# Patient Record
Sex: MALE | Race: WHITE | Employment: UNEMPLOYED | URBAN - METROPOLITAN AREA
[De-identification: names, ages, dates, MRNs, and addresses within clinical notes are randomized per-mention and may not be internally consistent; named-entity substitution may affect disease eponyms.]

---

## 2018-10-26 ENCOUNTER — PROCEDURE VISIT (OUTPATIENT)
Dept: AUDIOLOGY | Age: 68
End: 2018-10-26
Payer: MEDICARE

## 2018-10-26 ENCOUNTER — OFFICE VISIT (OUTPATIENT)
Dept: ENT CLINIC | Age: 68
End: 2018-10-26
Payer: MEDICARE

## 2018-10-26 VITALS
WEIGHT: 200.5 LBS | HEIGHT: 69 IN | BODY MASS INDEX: 29.7 KG/M2 | DIASTOLIC BLOOD PRESSURE: 71 MMHG | HEART RATE: 56 BPM | SYSTOLIC BLOOD PRESSURE: 122 MMHG

## 2018-10-26 DIAGNOSIS — H90.3 SENSORINEURAL HEARING LOSS (SNHL) OF BOTH EARS: Primary | ICD-10-CM

## 2018-10-26 DIAGNOSIS — H90.3 SENSORINEURAL HEARING LOSS, BILATERAL: ICD-10-CM

## 2018-10-26 PROCEDURE — 92557 COMPREHENSIVE HEARING TEST: CPT | Performed by: AUDIOLOGIST

## 2018-10-26 PROCEDURE — 99213 OFFICE O/P EST LOW 20 MIN: CPT | Performed by: OTOLARYNGOLOGY

## 2018-10-26 PROCEDURE — 92567 TYMPANOMETRY: CPT | Performed by: AUDIOLOGIST

## 2018-10-26 RX ORDER — DONEPEZIL HYDROCHLORIDE 5 MG/1
5 TABLET, ORALLY DISINTEGRATING ORAL NIGHTLY
COMMUNITY

## 2018-10-26 ASSESSMENT — ENCOUNTER SYMPTOMS: SHORTNESS OF BREATH: 0

## 2018-10-26 NOTE — PROGRESS NOTES
[address] [facility] Otolaryngology  Dr. Adam Edmond. Wheeler Scheuermann. Ms.Ed        Patient Name:  Damian Meyer  :  1950     CHIEF C/O:    Chief Complaint   Patient presents with    Hearing Problem     1 yr hearing check; havent noticed any changes but tends to get plugged up       HISTORY OBTAINED FROM:  patient    HISTORY OF PRESENT ILLNESS:       Terrance Randle is a 76y.o. year old male, here today for follow up of hearing evaluation. No changes or infections since his last visit, continues to use hearing aids without issue, occasionally feels plugged up. Denies otalgia, otorrhea, tinnitus, allergies, nasal congestion. Past Medical History:   Diagnosis Date    Hepatitis     from food 30 years ago    Hypertension      Past Surgical History:   Procedure Laterality Date    HAND TENDON SURGERY  35 years ago    left hand middle finger       Current Outpatient Prescriptions:     donepezil (ARICEPT ODT) 5 MG disintegrating tablet, Take 5 mg by mouth nightly, Disp: , Rfl:     metFORMIN (GLUCOPHAGE) 500 MG tablet, Take 500 mg by mouth daily. , Disp: , Rfl:     simvastatin (ZOCOR) 40 MG tablet, Take 40 mg by mouth nightly., Disp: , Rfl:     triamterene-hydrochlorothiazide (MAXZIDE-25) 37.5-25 MG per tablet, Take 1 tablet by mouth daily. , Disp: , Rfl:     aspirin 81 MG EC tablet, Take 81 mg by mouth daily. , Disp: , Rfl:     Psyllium (METAMUCIL PO), Take  by mouth., Disp: , Rfl:   Patient has no known allergies. Social History   Substance Use Topics    Smoking status: Never Smoker    Smokeless tobacco: Never Used    Alcohol use Yes      Comment: a couple drinks pe [facility]     Family History   Problem Relation Age of Onset    Dementia Mother     Cancer Father         unknown    Cancer Maternal Grandfather         unknown       Review of Systems   Constitutional: Negative for chills, fatigue and fever. HENT: Negative for congestion, ear discharge, ear pain, hearing loss and tinnitus.     Respiratory: Negative for cough and DO  11/10/18

## 2018-11-10 ASSESSMENT — ENCOUNTER SYMPTOMS
VOMITING: 0
COUGH: 0

## 2019-11-01 ENCOUNTER — OFFICE VISIT (OUTPATIENT)
Dept: ENT CLINIC | Age: 69
End: 2019-11-01
Payer: MEDICARE

## 2019-11-01 ENCOUNTER — PROCEDURE VISIT (OUTPATIENT)
Dept: AUDIOLOGY | Age: 69
End: 2019-11-01
Payer: MEDICARE

## 2019-11-01 VITALS
SYSTOLIC BLOOD PRESSURE: 133 MMHG | HEART RATE: 54 BPM | DIASTOLIC BLOOD PRESSURE: 79 MMHG | BODY MASS INDEX: 27.41 KG/M2 | WEIGHT: 191.5 LBS | HEIGHT: 70 IN

## 2019-11-01 DIAGNOSIS — H91.90 UNILATERAL HEARING LOSS: ICD-10-CM

## 2019-11-01 PROCEDURE — 92557 COMPREHENSIVE HEARING TEST: CPT | Performed by: AUDIOLOGIST

## 2019-11-01 PROCEDURE — 99213 OFFICE O/P EST LOW 20 MIN: CPT | Performed by: OTOLARYNGOLOGY

## 2019-11-01 PROCEDURE — 92567 TYMPANOMETRY: CPT | Performed by: AUDIOLOGIST

## 2019-11-11 ASSESSMENT — ENCOUNTER SYMPTOMS
VOMITING: 0
COUGH: 0
SHORTNESS OF BREATH: 0
SINUS PRESSURE: 0
VOICE CHANGE: 0
SORE THROAT: 0

## 2020-11-03 ENCOUNTER — PROCEDURE VISIT (OUTPATIENT)
Dept: AUDIOLOGY | Age: 70
End: 2020-11-03
Payer: MEDICARE

## 2020-11-03 ENCOUNTER — OFFICE VISIT (OUTPATIENT)
Dept: ENT CLINIC | Age: 70
End: 2020-11-03
Payer: MEDICARE

## 2020-11-03 VITALS
WEIGHT: 181.2 LBS | BODY MASS INDEX: 26.37 KG/M2 | DIASTOLIC BLOOD PRESSURE: 87 MMHG | HEART RATE: 55 BPM | SYSTOLIC BLOOD PRESSURE: 135 MMHG

## 2020-11-03 PROCEDURE — 99213 OFFICE O/P EST LOW 20 MIN: CPT | Performed by: OTOLARYNGOLOGY

## 2020-11-03 PROCEDURE — 92567 TYMPANOMETRY: CPT | Performed by: AUDIOLOGIST

## 2020-11-03 PROCEDURE — 92557 COMPREHENSIVE HEARING TEST: CPT | Performed by: AUDIOLOGIST

## 2020-11-03 RX ORDER — AZELASTINE 1 MG/ML
2 SPRAY, METERED NASAL 2 TIMES DAILY
Qty: 1 BOTTLE | Refills: 1 | Status: SHIPPED
Start: 2020-11-03 | End: 2021-11-09

## 2020-11-03 NOTE — PROGRESS NOTES
[id number] [facility] Otolaryngology  Dr. Hart Listen. Rosas Kumar. Ms.Ed        Patient Name:  Franko Mae  :  1950     CHIEF C/O:    Chief Complaint   Patient presents with    Hearing Loss     no noticable change to hearing per pt       HISTORY OBTAINED FROM:  patient    HISTORY OF PRESENT ILLNESS:       Iker Espino is a 79y.o. year old male, here today for follow up of known history of bilateral sensorineural hearing loss. Patient states he is here for routine follow-up, denies any new significant changes in hearing loss, no new changes in tinnitus or recent history of vertigo. No current complaints of fever chills or sore throat, he does complain of chronic congestion with noted clear rhinorrhea. He states the rhinorrhea is becoming persistently more significant, without any associated epistaxis. Patient has tried over-the-counter Flonase, as well as oral antidecongestants without any significant improvement. Past Medical History:   Diagnosis Date    Hepatitis     from food 30 years ago    Hypertension      Past Surgical History:   Procedure Laterality Date    HAND TENDON SURGERY  35 years ago    left hand middle finger       Current Outpatient Medications:     azelastine (ASTELIN) 0.1 % nasal spray, 2 sprays by Nasal route 2 times daily Use in each nostril as directed, Disp: 1 Bottle, Rfl: 1    donepezil (ARICEPT ODT) 5 MG disintegrating tablet, Take 5 mg by mouth nightly, Disp: , Rfl:     metFORMIN (GLUCOPHAGE) 500 MG tablet, Take 500 mg by mouth daily. , Disp: , Rfl:     simvastatin (ZOCOR) 40 MG tablet, Take 40 mg by mouth nightly., Disp: , Rfl:     triamterene-hydrochlorothiazide (MAXZIDE-25) 37.5-25 MG per tablet, Take 1 tablet by mouth daily. , Disp: , Rfl:     aspirin 81 MG EC tablet, Take 81 mg by mouth daily. , Disp: , Rfl:     Psyllium (METAMUCIL PO), Take  by mouth., Disp: , Rfl:   Patient has no known allergies.   Social History     Tobacco Use    Smoking status: Never Smoker    Smokeless motion. Lymphadenopathy:      Cervical: No cervical adenopathy. Skin:     General: Skin is warm. Findings: No erythema. Neurological:      Mental Status: He is alert. Cranial Nerves: No cranial nerve deficit. IMPRESSION/PLAN:  Patient seen and examined, full audiogram is consistent with previous audiogram showing mild to moderate bilateral sensorineural hearing loss    Dr. Robert Pedroza.  Otolaryngology Facial Plastic Surgery  :  [address] [place] [address] Otolaryngology/Facial Plastic Surgery Residency  Associate Clinical Professor:  Willis Bowman, [facility]

## 2020-11-03 NOTE — PROGRESS NOTES
This patient was referred for audiometric/tympanometric testing by Dr. Augustine Hinds due to a longstanding hearing loss and tinnitus, right ear worse. Patient is being seen for his yearly ENT/    Audiometry revealed a mild-to-moderately-severe sensorineural hearing loss, through the frequency range, bilaterally (right ear/slightly worse). Reliability was good. Speech reception thresholds were in good agreement with the pure tone averages, bilaterally. Speech discrimination scores were 92%, at 80dBHL, right ear and 65dBHL, left ear. Tympanometry revealed normal middle ear peak pressure and compliance, bilaterally. Ipsilateral acoustic reflexes were absent, right ear and present, left ear at 1000Hz. The results were reviewed with the patient. Recommendations for follow up will be made pending physician consult.     Christiano Cowan [facility]

## 2020-11-20 ASSESSMENT — ENCOUNTER SYMPTOMS
SINUS PAIN: 0
COUGH: 0
SORE THROAT: 0
VOMITING: 0
SHORTNESS OF BREATH: 0
RHINORRHEA: 1
VOICE CHANGE: 0
SINUS PRESSURE: 0
TROUBLE SWALLOWING: 0

## 2021-11-09 ENCOUNTER — PROCEDURE VISIT (OUTPATIENT)
Dept: AUDIOLOGY | Age: 71
End: 2021-11-09
Payer: MEDICARE

## 2021-11-09 ENCOUNTER — OFFICE VISIT (OUTPATIENT)
Dept: ENT CLINIC | Age: 71
End: 2021-11-09
Payer: MEDICARE

## 2021-11-09 VITALS — HEIGHT: 70 IN | BODY MASS INDEX: 26.05 KG/M2 | WEIGHT: 182 LBS

## 2021-11-09 DIAGNOSIS — H90.3 SENSORINEURAL HEARING LOSS (SNHL) OF BOTH EARS: Primary | ICD-10-CM

## 2021-11-09 DIAGNOSIS — H90.3 SENSORY HEARING LOSS, BILATERAL: Primary | ICD-10-CM

## 2021-11-09 DIAGNOSIS — H61.23 CERUMEN DEBRIS ON TYMPANIC MEMBRANE OF BOTH EARS: ICD-10-CM

## 2021-11-09 DIAGNOSIS — H93.13 TINNITUS, BILATERAL: ICD-10-CM

## 2021-11-09 PROCEDURE — 92567 TYMPANOMETRY: CPT | Performed by: AUDIOLOGIST

## 2021-11-09 PROCEDURE — 99214 OFFICE O/P EST MOD 30 MIN: CPT | Performed by: OTOLARYNGOLOGY

## 2021-11-09 PROCEDURE — 92557 COMPREHENSIVE HEARING TEST: CPT | Performed by: AUDIOLOGIST

## 2021-11-09 PROCEDURE — 69210 REMOVE IMPACTED EAR WAX UNI: CPT | Performed by: OTOLARYNGOLOGY

## 2021-11-09 RX ORDER — SIMVASTATIN 40 MG
20 TABLET ORAL
COMMUNITY
End: 2021-11-09 | Stop reason: ALTCHOICE

## 2021-11-09 RX ORDER — SIMVASTATIN 20 MG
20 TABLET ORAL NIGHTLY
COMMUNITY

## 2021-11-09 RX ORDER — OXYBUTYNIN CHLORIDE 5 MG/1
5 TABLET, EXTENDED RELEASE ORAL DAILY
COMMUNITY

## 2021-11-09 RX ORDER — MULTIVITAMIN WITH IRON
100 TABLET ORAL 2 TIMES DAILY
COMMUNITY

## 2021-11-09 NOTE — PROGRESS NOTES
This patient was referred for audiometric/tympanometric testing by Dr. Nalini Alvares due to longstanding bilateral hearing loss. He reported increased difficulty hearing, especially in background noise. He denied any tinnitus, dizziness, or ear pain. Audiometry revealed an essentially mild sloping to severe sensorineural hearing loss, in the right ear and essentially mild sloping to moderately severe sensorineural hearing loss, in the left ear. Reliability was good. Speech reception thresholds were in good agreement with the pure tone averages, bilaterally. Speech discrimination scores were good, in the right ear and excellent, in the left ear. Tympanometry revealed normal middle ear peak pressure and compliance, bilaterally. The results were reviewed with the patient. Recommendations for follow up will be made pending physician consult. Briefly discussed new hearing aid options. Gave patient information for [employer] hearing aid providers. He will follow up if needed.     Soha Peralta [facility]  [address] [facility] [phone]  Electronically signed by Soha Peralta on 11/9/2021 at 3:54 PM

## 2021-11-09 NOTE — PROGRESS NOTES
Yearly hearingf relatively stabelle    wqill nneed new HA as there are 11years old    Cleaned ears as well   See I 1 year     [facility] Otolaryngology  Dr. Grant Barrios. Graham Tracy. Ms.Ed        Patient Name:  Evan Dumont  :  1950     CHIEF C/O:    Chief Complaint   Patient presents with    Hearing Problem     yearly hearing eval-- thinks hearing is worse but cannot tell for sure since its gradual    Cerumen Impaction     right sided impaction       HISTORY OBTAINED FROM:  patient    HISTORY OF PRESENT ILLNESS:       Elier Perry is a 70y.o. year old male, here today for follow up of history of bilateral sensorineural hearing loss, without any new significant tinnitus vertigo or new changes in hearing. He is noting that he has difficulty with using his hearing aids, and that intermittently functioning inappropriately. Denies any other complaints of ear pain or drainage, no complaints of nasal congestion fever chills sore throat, no complaints of hoarseness at this time. Full audiogram was conducted today and reviewed with the patient. Past Medical History:   Diagnosis Date    Hepatitis     from food 30 years ago    Hypertension      Past Surgical History:   Procedure Laterality Date    HAND TENDON SURGERY  35 years ago    left hand middle finger       Current Outpatient Medications:     oxybutynin (DITROPAN-XL) 5 MG extended release tablet, Take 5 mg by mouth daily, Disp: , Rfl:     vitamin B-6 (PYRIDOXINE) 100 MG tablet, Take 100 mg by mouth 2 times daily, Disp: , Rfl:     simvastatin (ZOCOR) 20 MG tablet, Take 20 mg by mouth nightly, Disp: , Rfl:     donepezil (ARICEPT ODT) 5 MG disintegrating tablet, Take 5 mg by mouth nightly, Disp: , Rfl:     metFORMIN (GLUCOPHAGE) 500 MG tablet, Take 500 mg by mouth daily. , Disp: , Rfl:     triamterene-hydrochlorothiazide (MAXZIDE-25) 37.5-25 MG per tablet, Take 1 tablet by mouth daily. , Disp: , Rfl:     aspirin 81 MG EC tablet, Take 81 mg by mouth daily. , Disp: , Rfl:   Patient has no known allergies. Social History     Tobacco Use    Smoking status: Never Smoker    Smokeless tobacco: Never Used   Substance Use Topics    Alcohol use: Yes     Comment: a couple drinks pe rweek    Drug use: No     Family History   Problem Relation Age of Onset    Dementia Mother     Cancer Father         unknown    Cancer Maternal Grandfather         unknown       Review of Systems   Constitutional: Negative for chills and fever. HENT: Negative for congestion, ear discharge, hearing loss, rhinorrhea, sinus pressure and sinus pain. Respiratory: Negative for cough and shortness of breath. Cardiovascular: Negative for chest pain and palpitations. Gastrointestinal: Negative for vomiting. Skin: Negative for rash. Allergic/Immunologic: Negative for environmental allergies. Neurological: Negative for dizziness and headaches. Hematological: Does not bruise/bleed easily. All other systems reviewed and are negative. Ht 5' 9.5\" (1.765 m)   Wt 182 lb (82.6 kg)   BMI 26.49 kg/m²   Physical Exam                 Procedure: Cerumen Impaction    Pre-op: Cerumen Impaction    Post Op: Same    Procedure: Cerumen Impaction removal    Surgeon: Jason Unger    Procedure: Under microscopic assistance large cerumen impaction was removed using gentle suction and curette. TM intact B/L, Pt tolerated procedure well    Complications: None    Blood Loss Minimial      IMPRESSION/PLAN:  Patient seen and examined for known history of bilateral sensorineural hearing loss, which audiogram today shows relatively stable changes patient is requesting a new hearing aids, as he is a greater than 11years old and no longer functioning appropriately for his level of hearing loss. He will be referred for audiology for refitting of hearing aids.     Patient also with a history of ear fullness and pressure, with bilateral cerumen packs was removed under microscopic assistance today without complication. Patient will follow-up for yearly hearing screen ear cerumen packs removal.    Dr. Elodia Schirmer D. Bunevich D.O. Ms. Mark Chasten.  Otolaryngology Facial Plastic Surgery  :  [facility] Otolaryngology/Facial Plastic Surgery Residency  Associate Clinical Professor:  Ya Galdamez, [employer]  [employer]

## 2021-11-29 ASSESSMENT — ENCOUNTER SYMPTOMS
SINUS PAIN: 0
SINUS PRESSURE: 0
SHORTNESS OF BREATH: 0
RHINORRHEA: 0
VOMITING: 0
COUGH: 0

## 2022-11-16 ENCOUNTER — PROCEDURE VISIT (OUTPATIENT)
Dept: AUDIOLOGY | Age: 72
End: 2022-11-16
Payer: MEDICARE

## 2022-11-16 ENCOUNTER — OFFICE VISIT (OUTPATIENT)
Dept: ENT CLINIC | Age: 72
End: 2022-11-16
Payer: MEDICARE

## 2022-11-16 VITALS
WEIGHT: 187.7 LBS | HEIGHT: 69 IN | BODY MASS INDEX: 27.8 KG/M2 | DIASTOLIC BLOOD PRESSURE: 73 MMHG | HEART RATE: 66 BPM | SYSTOLIC BLOOD PRESSURE: 117 MMHG

## 2022-11-16 DIAGNOSIS — H90.3 SENSORINEURAL HEARING LOSS, BILATERAL: Primary | ICD-10-CM

## 2022-11-16 DIAGNOSIS — H90.3 SENSORY HEARING LOSS, BILATERAL: Primary | ICD-10-CM

## 2022-11-16 PROCEDURE — 92557 COMPREHENSIVE HEARING TEST: CPT | Performed by: AUDIOLOGIST

## 2022-11-16 PROCEDURE — 99213 OFFICE O/P EST LOW 20 MIN: CPT | Performed by: OTOLARYNGOLOGY

## 2022-11-16 PROCEDURE — 1123F ACP DISCUSS/DSCN MKR DOCD: CPT | Performed by: OTOLARYNGOLOGY

## 2022-11-16 PROCEDURE — 69210 REMOVE IMPACTED EAR WAX UNI: CPT | Performed by: OTOLARYNGOLOGY

## 2022-11-16 PROCEDURE — 92567 TYMPANOMETRY: CPT | Performed by: AUDIOLOGIST

## 2022-11-16 ASSESSMENT — ENCOUNTER SYMPTOMS
COUGH: 0
SHORTNESS OF BREATH: 0
SINUS PRESSURE: 0
SINUS PAIN: 0
VOMITING: 0
RHINORRHEA: 0

## 2022-11-16 ASSESSMENT — VISUAL ACUITY: OU: 1

## 2022-11-16 NOTE — PROGRESS NOTES
[facility] Otolaryngology  Dr. Edd Solares. Eleno Roberson. Ms.Ed        Patient Name:  Anuja Vaughan  :  1950     CHIEF C/O:    Chief Complaint   Patient presents with    Hearing Loss     In crowds seems to be worse       HISTORY OBTAINED FROM:  patient    HISTORY OF PRESENT ILLNESS:       Yadi Mcpherson is a 67y.o. year old male, here today for follow up of history of bilateral sensorineural hearing loss, without any new significant tinnitus vertigo or new changes in hearing. He is noting that he has difficulty with using his hearing aids, and that intermittently functioning inappropriately. Denies any other complaints of ear pain or drainage, no complaints of nasal congestion fever chills sore throat, no complaints of hoarseness at this time. Full audiogram was conducted today and reviewed with the patient. 22: Pt doing well. Wearing hearing aids daily. Thinks they are not working as well. Denies any other issues such as ear pain or drainage or dizziness. .    Past Medical History:   Diagnosis Date    Hepatitis     from food 30 years ago    Hypertension      Past Surgical History:   Procedure Laterality Date    HAND TENDON SURGERY  35 years ago    left hand middle finger       Current Outpatient Medications:     oxybutynin (DITROPAN-XL) 5 MG extended release tablet, Take 5 mg by mouth daily, Disp: , Rfl:     vitamin B-6 (PYRIDOXINE) 100 MG tablet, Take 100 mg by mouth 2 times daily, Disp: , Rfl:     simvastatin (ZOCOR) 20 MG tablet, Take 20 mg by mouth nightly, Disp: , Rfl:     donepezil (ARICEPT ODT) 5 MG disintegrating tablet, Take 5 mg by mouth nightly, Disp: , Rfl:     metFORMIN (GLUCOPHAGE) 500 MG tablet, Take 500 mg by mouth daily. , Disp: , Rfl:     triamterene-hydrochlorothiazide (MAXZIDE-25) 37.5-25 MG per tablet, Take 1 tablet by mouth daily. , Disp: , Rfl:     aspirin 81 MG EC tablet, Take 81 mg by mouth daily. , Disp: , Rfl:   Patient has no known allergies.   Social History     Tobacco Use Smoking status: Never    Smokeless tobacco: Never   Substance Use Topics    Alcohol use: Yes     Comment: a couple drinks pe rweek    Drug use: No     Family History   Problem Relation Age of Onset    Dementia Mother     Cancer Father         unknown    Cancer Maternal Grandfather         unknown       Review of Systems   Constitutional:  Negative for chills and fever. HENT:  Negative for congestion, ear discharge, hearing loss, rhinorrhea, sinus pressure and sinus pain. Respiratory:  Negative for cough and shortness of breath. Cardiovascular:  Negative for chest pain and palpitations. Gastrointestinal:  Negative for vomiting. Skin:  Negative for rash. Allergic/Immunologic: Negative for environmental allergies. Neurological:  Negative for dizziness and headaches. Hematological:  Does not bruise/bleed easily. All other systems reviewed and are negative. /73 (Site: Left Upper Arm, Position: Sitting, Cuff Size: Medium Adult)   Pulse 66   Ht 5' 9\" (1.753 m)   Wt 187 lb 11.2 oz (85.1 kg)   BMI 27.72 kg/m²   Physical Exam  Constitutional:       Appearance: Normal appearance. He is normal weight. HENT:      Head: Normocephalic and atraumatic. Right Ear: Tympanic membrane, ear canal and external ear normal. No drainage. There is impacted cerumen. Left Ear: Tympanic membrane, ear canal and external ear normal. No drainage. Nose: Nose normal. No nasal deformity or septal deviation. Mouth/Throat:      Mouth: Mucous membranes are moist.   Eyes:      General: Lids are normal. Vision grossly intact. Extraocular Movements: Extraocular movements intact. Conjunctiva/sclera: Conjunctivae normal.      Pupils: Pupils are equal, round, and reactive to light. Cardiovascular:      Rate and Rhythm: Normal rate. Pulmonary:      Effort: Pulmonary effort is normal.   Musculoskeletal:         General: Normal range of motion. Cervical back: Normal range of motion. Lymphadenopathy:      Cervical: No cervical adenopathy. Skin:     Capillary Refill: Capillary refill takes less than 2 seconds. Neurological:      Mental Status: He is alert. Psychiatric:         Mood and Affect: Mood normal.                          Procedure: Cerumen Impaction    Pre-op: Cerumen Impaction    Post Op: Same    Procedure: Cerumen Impaction removal    Surgeon: Anjali Blackwell    Procedure: Under microscopic assistance large cerumen impaction was removed using gentle suction and curette from right ear. TM intact B/L, Pt tolerated procedure well    Complications: None    Blood Loss Minimial      IMPRESSION/PLAN:  Patient seen and examined for known history of bilateral sensorineural hearing loss, which audiogram today shows relatively stable changes slightly worse on right patient is requesting a new hearing aids, as he is a greater than 11years old and no longer functioning appropriately for his level of hearing loss. He will be referred for audiology for refitting of hearing aids. Dr. Vertie Hero D. Bunevich D.O. Ms. Gerold Gitelman.  Otolaryngology Facial Plastic Surgery  :  [facility] Otolaryngology/Facial Plastic Surgery Residency  Associate Clinical Professor:  Mark You, [facility]  1950      I have discussed the case, including pertinent history and exam findings with the resident. I have seen and examined the patient and the key elements of the encounter have been performed by me. I agree with the assessment, plan and orders as documented by the resident. Patient here for follow up of medical problems. Remainder of medical problems as per resident note.       [address] [facility],   12/6/22

## 2022-11-22 NOTE — PROGRESS NOTES
This patient was referred for audiometric and tympanometric testing by Dr. Ana Devi due to a longstanding hearing loss, bilaterally. Patient is being seen for his yearly ENT/Audiology consult, with a slight decrease in hearing sensitivity. Audiometry using pure tone air and bone conduction testing revealed a moderate-to-severe  sensorineural hearing loss, through the frequency range, bilaterally. Reliability was good. Speech reception thresholds were in good agreement with the pure tone averages, bilaterally. Speech discrimination scores were 76%, right ear at 95dBHL and 92%, left ear at 85dBHL. Tympanometry revealed negative middle ear peak pressure and normal compliance, bilaterally. Ipsilateral acoustic reflexes were absent, bilaterally at 1000Hz. The results were reviewed with the patient. Patient is interested in new hearing aids. HA Benefit letter was reviewed with patient, signed and scanned in Epic chart. Recommendations for follow up will be made pending physician consult.     Eleazar Silva CCC/VEDA  Audiologist  [id number]  NPI#:  [id number]      Electronically signed by Soha Hernandez on 11/22/2022 at 3:10 PM

## 2022-12-09 ENCOUNTER — TELEPHONE (OUTPATIENT)
Dept: AUDIOLOGY | Age: 72
End: 2022-12-09

## 2022-12-09 NOTE — TELEPHONE ENCOUNTER
Patient called and left a voicemail message. Returned patient message. Left a voicemail at the patient contact number.     Electronically signed by Soha Torres on 12/9/2022 at 11:04 AM

## 2022-12-13 ENCOUNTER — TELEPHONE (OUTPATIENT)
Dept: AUDIOLOGY | Age: 72
End: 2022-12-13

## 2023-11-21 ENCOUNTER — OFFICE VISIT (OUTPATIENT)
Dept: ENT CLINIC | Age: 73
End: 2023-11-21
Payer: MEDICARE

## 2023-11-21 ENCOUNTER — PROCEDURE VISIT (OUTPATIENT)
Dept: AUDIOLOGY | Age: 73
End: 2023-11-21
Payer: MEDICARE

## 2023-11-21 VITALS
DIASTOLIC BLOOD PRESSURE: 72 MMHG | SYSTOLIC BLOOD PRESSURE: 123 MMHG | BODY MASS INDEX: 28.28 KG/M2 | WEIGHT: 191.5 LBS | HEART RATE: 55 BPM

## 2023-11-21 DIAGNOSIS — H90.3 SENSORY HEARING LOSS, BILATERAL: Primary | ICD-10-CM

## 2023-11-21 PROCEDURE — 92567 TYMPANOMETRY: CPT | Performed by: AUDIOLOGIST

## 2023-11-21 PROCEDURE — 1123F ACP DISCUSS/DSCN MKR DOCD: CPT | Performed by: OTOLARYNGOLOGY

## 2023-11-21 PROCEDURE — 92557 COMPREHENSIVE HEARING TEST: CPT | Performed by: AUDIOLOGIST

## 2023-11-21 PROCEDURE — 99213 OFFICE O/P EST LOW 20 MIN: CPT | Performed by: OTOLARYNGOLOGY

## 2023-11-21 NOTE — PROGRESS NOTES
This patient was referred for audiometric and tympanometric testing by Dr. Beau Pham due to hearing loss. Audiometry using pure tone air and bone conduction testing revealed a mild-to-severe  sensorineural hearing loss, bilaterally. Reliability was good. Speech reception thresholds were in good agreement with the pure tone averages, bilaterally. Speech discrimination scores were excellent, bilaterally. Tympanometry revealed normal middle ear peak pressure and compliance, bilaterally. Hearing testing primarily stable compared to last year. The results were reviewed with the patient. Recommendations for follow up will be made pending physician consult.     Electronically signed by Soha Ariza on 11/21/2023 at 8:57 AM

## 2024-11-26 ENCOUNTER — PROCEDURE VISIT (OUTPATIENT)
Dept: AUDIOLOGY | Age: 74
End: 2024-11-26
Payer: MEDICARE

## 2024-11-26 ENCOUNTER — OFFICE VISIT (OUTPATIENT)
Dept: ENT CLINIC | Age: 74
End: 2024-11-26
Payer: MEDICARE

## 2024-11-26 VITALS
DIASTOLIC BLOOD PRESSURE: 82 MMHG | SYSTOLIC BLOOD PRESSURE: 134 MMHG | HEART RATE: 58 BPM | BODY MASS INDEX: 27.32 KG/M2 | WEIGHT: 185 LBS

## 2024-11-26 DIAGNOSIS — H90.3 SENSORY HEARING LOSS, BILATERAL: Primary | ICD-10-CM

## 2024-11-26 DIAGNOSIS — H61.23 BILATERAL IMPACTED CERUMEN: ICD-10-CM

## 2024-11-26 DIAGNOSIS — H90.3 SENSORINEURAL HEARING LOSS, ASYMMETRICAL: Primary | ICD-10-CM

## 2024-11-26 DIAGNOSIS — H90.3 SENSORINEURAL HEARING LOSS, BILATERAL: ICD-10-CM

## 2024-11-26 PROCEDURE — 1123F ACP DISCUSS/DSCN MKR DOCD: CPT | Performed by: OTOLARYNGOLOGY

## 2024-11-26 PROCEDURE — 92557 COMPREHENSIVE HEARING TEST: CPT | Performed by: AUDIOLOGIST

## 2024-11-26 PROCEDURE — 92567 TYMPANOMETRY: CPT | Performed by: AUDIOLOGIST

## 2024-11-26 PROCEDURE — 1159F MED LIST DOCD IN RCRD: CPT | Performed by: OTOLARYNGOLOGY

## 2024-11-26 PROCEDURE — 99213 OFFICE O/P EST LOW 20 MIN: CPT | Performed by: OTOLARYNGOLOGY

## 2024-11-26 RX ORDER — MEMANTINE HYDROCHLORIDE 5 MG/1
5 TABLET ORAL 3 TIMES DAILY
COMMUNITY
Start: 2024-10-24

## 2024-11-26 NOTE — PROGRESS NOTES
This patient was referred for audiometric and tympanometric testing by Dr. Conner due to a longstanding hearing loss, right ear worse.  Patient is being seen for his ENT/Audiology consult, with no significant changes to his symptoms, since his last test date.  Patient is wearing binaural hearing aids, at this time.    Audiometry using pure tone air and bone conduction testing revealed a normal-to-severe sensorineural hearing loss, through the frequency range, bilaterally. Reliability was good. Speech reception thresholds were in good agreement with the pure tone averages, bilaterally. Speech discrimination scores were 84%, right ear at 85dBHL and 92%, left ear at 70dBHL.    Tympanometry revealed normal middle ear peak pressure and compliance, right ear and a seal could not be obtained, left ear.    The results were reviewed with the patient and ordering provider.     Recommendations for follow up will be made pending ordering provider consult.    Ralf Valencia CCC/VEDA  Audiologist  [id number]  NPI#:  [id number]      Electronically signed by Soha Whitfield on 11/26/2024 at 9:34 AM       Normal for race

## 2024-11-26 NOTE — PROGRESS NOTES
mouth nightly, Disp: , Rfl:     metFORMIN (GLUCOPHAGE) 500 MG tablet, Take 500 mg by mouth daily., Disp: , Rfl:     triamterene-hydrochlorothiazide (MAXZIDE-25) 37.5-25 MG per tablet, Take 1 tablet by mouth daily., Disp: , Rfl:     aspirin 81 MG EC tablet, Take 81 mg by mouth daily., Disp: , Rfl:      Patient has no known allergies.  Social History            Tobacco Use    Smoking status: Never    Smokeless tobacco: Never   Substance Use Topics    Alcohol use: Yes       Comment: a couple drinks pe rwk    Drug use: No      Family History         Family History   Problem Relation Age of Onset    Dementia Mother      Cancer Father           unknown    Cancer Maternal Grandfather           unknown            Review of Systems   Constitutional:  Negative for chills and fever.   HENT:  Negative for congestion, ear discharge, hearing loss, rhinorrhea, sinus pressure and sinus pain.    Respiratory:  Negative for cough and shortness of breath.    Cardiovascular:  Negative for chest pain and palpitations.   Gastrointestinal:  Negative for vomiting.   Skin:  Negative for rash.   Allergic/Immunologic: Negative for environmental allergies.   Neurological:  Negative for dizziness and headaches.   Hematological:  Does not bruise/bleed easily.   All other systems reviewed and are negative.     /73 (Site: Left Upper Arm, Position: Sitting, Cuff Size: Medium Adult)   Pulse 66   Ht 5' 9\" (1.753 m)   Wt 187 lb 11.2 oz (85.1 kg)   BMI 27.72 kg/m²   Physical Exam  Constitutional:       Appearance: Normal appearance. He is normal weight.   HENT:      Head: Normocephalic and atraumatic.      Right Ear: Tympanic membrane, ear canal and external ear normal. No drainage. There is cerumen in the EAC.      Left Ear: Tympanic membrane, ear canal and external ear normal. No drainage.      Nose: Nose normal. No nasal deformity or septal deviation.      Mouth/Throat:      Mouth: Mucous membranes are moist.   Eyes:      General: Lids